# Patient Record
Sex: FEMALE | Race: WHITE | HISPANIC OR LATINO | URBAN - METROPOLITAN AREA
[De-identification: names, ages, dates, MRNs, and addresses within clinical notes are randomized per-mention and may not be internally consistent; named-entity substitution may affect disease eponyms.]

---

## 2024-04-25 ENCOUNTER — HOSPITAL ENCOUNTER (EMERGENCY)
Facility: HOSPITAL | Age: 54
Discharge: LEFT BEFORE DISCHARGE | End: 2024-04-25
Attending: EMERGENCY MEDICINE
Payer: COMMERCIAL

## 2024-04-25 VITALS
BODY MASS INDEX: 29.02 KG/M2 | OXYGEN SATURATION: 99 % | RESPIRATION RATE: 18 BRPM | SYSTOLIC BLOOD PRESSURE: 146 MMHG | WEIGHT: 170 LBS | DIASTOLIC BLOOD PRESSURE: 82 MMHG | TEMPERATURE: 99 F | HEART RATE: 96 BPM | HEIGHT: 64 IN

## 2024-04-25 DIAGNOSIS — S39.012A STRAIN OF LUMBAR REGION, INITIAL ENCOUNTER: ICD-10-CM

## 2024-04-25 DIAGNOSIS — M43.06 LUMBAR SPONDYLOLYSIS: Primary | ICD-10-CM

## 2024-04-25 PROCEDURE — 99284 EMERGENCY DEPT VISIT MOD MDM: CPT | Mod: 25

## 2024-04-25 PROCEDURE — 25000003 PHARM REV CODE 250

## 2024-04-25 PROCEDURE — 96372 THER/PROPH/DIAG INJ SC/IM: CPT

## 2024-04-25 PROCEDURE — 63600175 PHARM REV CODE 636 W HCPCS

## 2024-04-25 RX ORDER — OXYCODONE HYDROCHLORIDE 5 MG/1
5 TABLET ORAL
Status: COMPLETED | OUTPATIENT
Start: 2024-04-25 | End: 2024-04-25

## 2024-04-25 RX ORDER — METHOCARBAMOL 500 MG/1
1000 TABLET, FILM COATED ORAL
Status: COMPLETED | OUTPATIENT
Start: 2024-04-25 | End: 2024-04-25

## 2024-04-25 RX ORDER — NAPROXEN 500 MG/1
500 TABLET ORAL 2 TIMES DAILY
Qty: 30 TABLET | Refills: 0 | Status: SHIPPED | OUTPATIENT
Start: 2024-04-25

## 2024-04-25 RX ORDER — METHOCARBAMOL 500 MG/1
1000 TABLET, FILM COATED ORAL 3 TIMES DAILY
Qty: 30 TABLET | Refills: 0 | Status: SHIPPED | OUTPATIENT
Start: 2024-04-25 | End: 2024-04-30

## 2024-04-25 RX ORDER — LIDOCAINE 50 MG/G
1 PATCH TOPICAL DAILY
Qty: 15 PATCH | Refills: 0 | Status: SHIPPED | OUTPATIENT
Start: 2024-04-25

## 2024-04-25 RX ORDER — DEXAMETHASONE SODIUM PHOSPHATE 4 MG/ML
6 INJECTION, SOLUTION INTRA-ARTICULAR; INTRALESIONAL; INTRAMUSCULAR; INTRAVENOUS; SOFT TISSUE
Status: COMPLETED | OUTPATIENT
Start: 2024-04-25 | End: 2024-04-25

## 2024-04-25 RX ORDER — KETOROLAC TROMETHAMINE 30 MG/ML
30 INJECTION, SOLUTION INTRAMUSCULAR; INTRAVENOUS
Status: COMPLETED | OUTPATIENT
Start: 2024-04-25 | End: 2024-04-25

## 2024-04-25 RX ADMIN — DEXAMETHASONE SODIUM PHOSPHATE 6 MG: 4 INJECTION INTRA-ARTICULAR; INTRALESIONAL; INTRAMUSCULAR; INTRAVENOUS; SOFT TISSUE at 08:04

## 2024-04-25 RX ADMIN — METHOCARBAMOL 1000 MG: 500 TABLET ORAL at 08:04

## 2024-04-25 RX ADMIN — OXYCODONE 5 MG: 5 TABLET ORAL at 09:04

## 2024-04-25 RX ADMIN — KETOROLAC TROMETHAMINE 30 MG: 30 INJECTION, SOLUTION INTRAMUSCULAR at 08:04

## 2024-04-25 NOTE — DISCHARGE INSTRUCTIONS
Thank you for coming to our Emergency Department today. It is important to remember that some problems or medical conditions are difficult to diagnose and may not be found or addressed during your Emergency Department visit.  These conditions often start with non-specific symptoms and can only be diagnosed on follow up visits with your primary care physician or specialist when the symptoms continue or change. Please remember that all medical conditions can change, and we cannot predict how you will be feeling tomorrow or the next day. Return to the ER with any questions/concerns, new/concerning symptoms, worsening or failure to improve. Also, please follow up with your Primary Care Physician and/or Pediatrician in the next 1-2 days to review your ED visit in entirety and for re-evaluation.   Be sure to follow up with your primary care doctor and review all labs/imaging/tests that were performed during your ER visit with them. It is very common for us to identify non-emergent incidental findings which must be followed up with your primary care physician.  Some labs/imaging/tests may be outside of the normal range, and require non-emergent follow-up and/or further investigation/treatment/procedures/testing to help diagnose/exclude/prevent complications or other potentially serious medical conditions. Some abnormalities may not have been discussed or addressed during your ER visit. Some lab results may not return during your ER visit but can be accessible by downloading the free [facility] rupert or by visiting [email or web address] . It is important for you to review all labs/imaging/tests which are outside of the normal range with your physician.  An ER visit does not replace a primary care visit, and many screening tests or follow-up tests cannot be ordered by an ER doctor or performed by the ER. Some tests may even require pre-approval.  If you do not have a primary care doctor, you may contact the one listed  on your discharge paperwork or you may also call the [facility] Appointment Desk at [phone] , or [facility] at  [phone] to schedule an appointment, or establish care with a primary care doctor or even a specialist and to obtain information about local resources. It is important to your health that you have a primary care doctor.  Please take all medications as directed. We have done our best to select a medication for you that will treat your condition however, all medications may potentially have side-effects and it is impossible to predict which medications may give you side-effects or what those side-effects (if any) those medications may give you.  If you feel that you are having a negative effect or side-effect of any medication you should stop taking those medications immediately and seek medical attention. If you feel that you are having a life-threatening reaction call 911.  Do not drive, swim, climb to height, take a bath, operate heavy machinery, drink alcohol or take potentially sedating medications, sign any legal documents or make any important decisions for 24 hours if you have received any pain medications, sedatives or mood altering drugs during your ER visit or within 24 hours of taking them if they have been prescribed to you.   You can find additional resources for Dentists, hearing aids, durable medical equipment, low cost pharmacies and other resources at [email or web address]  Patient agrees with this plan. Discussed with her strict return precautions, they verbalized understanding. Patient is stable for discharge.

## 2024-04-25 NOTE — ED TRIAGE NOTES
Reports right lower back pain that radiates to right buttock. Denies problems with bowel/bladder function. No OTC medication for comfort.

## 2024-04-25 NOTE — ED PROVIDER NOTES
Encounter Date: 4/25/2024    SCRIBE #1 NOTE: I, Cornelia Sierra, am scribing for, and in the presence of,  David Howell PA-C. I have scribed the following portions of the note - Other sections scribed: HPI.       History     Chief Complaint   Patient presents with    Back Pain     Pt c/o right lower back pain extending into right buttock. Pain started while washing the car. No incontinence. No pain medications PTA      CC: Back pain    HPI: This is a 53 y.o.female patient, with a PMHx of chronic back pain, HTN ( on 20 mg Lisinopril), and sciatica, presenting to the ED for further evaluation of right lower back pain beginning last night, worsening this morning while getting ready. Patient states she did strenuous activity last night when washing her truck. Patient states she took Ibuprofen for relief ( last dose at 2:00 AM today) and applied Icy Hot without any relief. Patient denies any numbness to groin, bladder/bowel incontinence, or urinary retention. Patient denies any fever, chills, shortness of breath, chest pain, dysuria, or any other associated symptoms. No alleviating or aggravating factors. No known drug allergies.        The history is provided by the patient. No  was used.     Review of patient's allergies indicates:   Allergen Reactions    Opioids - morphine analogues Itching     No past medical history on file.  No past surgical history on file.  No family history on file.     Review of Systems   Constitutional:  Negative for chills, diaphoresis, fatigue and fever.   HENT:  Negative for congestion, rhinorrhea and sore throat.    Eyes:  Negative for redness and visual disturbance.   Respiratory:  Negative for cough and shortness of breath.    Cardiovascular:  Negative for chest pain, palpitations and leg swelling.   Gastrointestinal:  Negative for abdominal pain, diarrhea, nausea and vomiting.   Genitourinary:  Negative for difficulty urinating, dysuria, flank pain and frequency.    Musculoskeletal:  Positive for back pain (right lower). Negative for arthralgias, myalgias, neck pain and neck stiffness.   Skin:  Negative for rash.   Neurological:  Negative for dizziness, weakness, light-headedness, numbness and headaches.   Hematological:  Does not bruise/bleed easily.       Physical Exam     Initial Vitals [04/25/24 0748]   BP Pulse Resp Temp SpO2   (!) 146/82 96 18 98.5 °F (36.9 °C) 99 %      MAP       --         Physical Exam    Nursing note and vitals reviewed.  Constitutional: She appears well-developed and well-nourished. She is not diaphoretic. No distress.   HENT:   Head: Normocephalic and atraumatic.   Right Ear: External ear normal.   Left Ear: External ear normal.   Nose: Nose normal.   Mouth/Throat: Oropharynx is clear and moist.   Eyes: Conjunctivae and EOM are normal. Pupils are equal, round, and reactive to light. Right eye exhibits no discharge. Left eye exhibits no discharge.   Neck: Neck supple.   Normal range of motion.   Full passive range of motion without pain.     Cardiovascular:  Normal rate, regular rhythm, normal heart sounds and normal pulses.     Exam reveals no distant heart sounds and no friction rub.       Pulmonary/Chest: Effort normal and breath sounds normal. No respiratory distress.   Abdominal: Abdomen is soft. Bowel sounds are normal. She exhibits no distension, no pulsatile midline mass and no mass. There is no splenomegaly or hepatomegaly. There is no abdominal tenderness.   No right CVA tenderness.  No left CVA tenderness. There is no rebound and no guarding.   Musculoskeletal:         General: Normal range of motion.      Right shoulder: Normal.      Left shoulder: Normal.      Right elbow: Normal.      Left elbow: Normal.      Right wrist: Normal.      Left wrist: Normal.      Cervical back: Normal, full passive range of motion without pain, normal range of motion and neck supple.      Thoracic back: Spasms present. No swelling, edema, deformity, signs  of trauma, lacerations, tenderness or bony tenderness. Normal range of motion. No scoliosis.      Lumbar back: Tenderness present. No swelling, edema, deformity, signs of trauma, lacerations, spasms or bony tenderness. Normal range of motion. Negative right straight leg raise test and negative left straight leg raise test. No scoliosis.        Back:       Right hip: Normal.      Left hip: Normal.      Right knee: Normal.      Left knee: Normal.      Right lower leg: Normal.      Left lower leg: Normal.      Right ankle: Normal.      Left ankle: Normal.      Left foot: Normal.     Neurological: She is alert and oriented to person, place, and time. She has normal strength. No cranial nerve deficit or sensory deficit. Gait normal.   Skin: Skin is warm and dry. Capillary refill takes less than 2 seconds. No bruising, no ecchymosis and no rash noted. No pallor.   Psychiatric: She has a normal mood and affect. Her speech is normal and behavior is normal. Thought content normal.         ED Course   Procedures  Labs Reviewed - No data to display       Imaging Results              X-Ray Lumbar Spine Ap And Lateral (Final result)  Result time 04/25/24 09:11:46      Final result by Kojo Vilchis MD (04/25/24 09:11:46)                   Impression:      Lumbar spondylosis.      Electronically signed by: Kojo Vilchis MD  Date:    04/25/2024  Time:    09:11               Narrative:    EXAMINATION:  XR LUMBAR SPINE AP AND LATERAL    CLINICAL HISTORY:  lumbar pain;    TECHNIQUE:  AP, lateral and spot images were performed of the lumbar spine.    COMPARISON:  None    FINDINGS:  Bilateral L5 pars defects with moderate L5-S1 anterolisthesis.  There is prominent disc height loss with endplate changes and facet arthropathy at this level.  Additional prominent degenerative disc disease/disc height loss at T12-L1 noted.  No fractures or marrow replacement process.  Osteopenia noted.                                       Medications    dexAMETHasone injection 6 mg (6 mg Intramuscular Given 4/25/24 0832)   methocarbamoL tablet 1,000 mg (1,000 mg Oral Given 4/25/24 0832)   ketorolac injection 30 mg (30 mg Intramuscular Given 4/25/24 0832)   oxyCODONE immediate release tablet 5 mg (5 mg Oral Given 4/25/24 0941)     Medical Decision Making  This is a 53 y.o.female patient, with a PMHx of chronic back pain, HTN, and sciatica, presenting to the ED for further evaluation of right lower back pain beginning last night, worsening this morning while getting ready. Patient states she did strenuous activity last night when washing her truck. Patient states she took Ibuprofen for relief ( last dose at 2:00 AM today) and applied Icy Hot without any relief. Patient denies any numbness to groin, bladder/bowel incontinence, or urinary retention. Patient denies any fever, chills, shortness of breath, chest pain, dysuria, or any other associated symptoms. No alleviating or aggravating factors. No known drug allergies.  Patient's chart and medical history reviewed.  Patient's vitals reviewed.  They are afebrile, no respiratory distress, nontoxic-appearing in the ED.  Differential diagnosis is considered as the following.  - MSK strain: considered with back pain and muscle spasms  - Pyelonephritis: unlikely with no CVA tenderness or fever or urinary complaints   - Spinal Fx: unlikely with normal neurovascular exam, no step-off or deformity on exam, no spinal TTP  - Discitis/Spinal abscess/Osteomyelitis: unlikely with no risk factors including current immunosuppression, hemodialysis, current or recent injection drug use, current or recent invasive epidural/spinal procedure, current or recent endocarditis or bacteremia. no spinal tenderness, no fever. no neuro deficits.  - Cauda Equina: unlikely with no saddle anesthesia, urinary or bowel retention or incontinence.  - Metastasis: unlikely with no current or past Hx of cancer including breast, prostate, lung, kidney,  thyroid.   - stenosis/herniation: considered with back pain   While treating the patients pain, pt states she needs to leave right now to catch a flight so she can make it to her grandsons' graduation. I advise patient of the risks of leaving against medical advice up into disability and death.  Patient acknowledges these risks and accepts responsibility.  Patient's signs AMA form prior to leaving.      Amount and/or Complexity of Data Reviewed  Radiology: ordered.    Risk  Prescription drug management.            Scribe Attestation:   Scribe #1: I performed the above scribed service and the documentation accurately describes the services I performed. I attest to the accuracy of the note.        ED Course as of 04/25/24 0948   Thu Apr 25, 2024   0936 After Decadron, Toradol, Robaxin, patient states pain is still 10/10. Will give Oxycodon [AF]      ED Course User Index  [AF] David Howell PA-C                           Clinical Impression:  Final diagnoses:  [M43.06] Lumbar spondylolysis (Primary)  [S39.012A] Strain of lumbar region, initial encounter          ED Disposition Condition    AMA Stable             David CARLOS PA-C, personally performed the services described in this documentation. All medical record entries made by the scribe were at my direction and in my presence. I have reviewed the chart and agree that the record reflects my personal performance and is accurate and complete.       David Howell PA-C  04/25/24 0930